# Patient Record
Sex: MALE | Race: WHITE | HISPANIC OR LATINO | Employment: OTHER | URBAN - METROPOLITAN AREA
[De-identification: names, ages, dates, MRNs, and addresses within clinical notes are randomized per-mention and may not be internally consistent; named-entity substitution may affect disease eponyms.]

---

## 2018-11-06 ENCOUNTER — HOSPITAL ENCOUNTER (EMERGENCY)
Facility: HOSPITAL | Age: 63
Discharge: HOME | End: 2018-11-06
Attending: EMERGENCY MEDICINE
Payer: COMMERCIAL

## 2018-11-06 VITALS
DIASTOLIC BLOOD PRESSURE: 70 MMHG | HEART RATE: 51 BPM | HEIGHT: 68 IN | BODY MASS INDEX: 31.83 KG/M2 | RESPIRATION RATE: 18 BRPM | TEMPERATURE: 98 F | SYSTOLIC BLOOD PRESSURE: 146 MMHG | OXYGEN SATURATION: 96 % | WEIGHT: 210 LBS

## 2018-11-06 DIAGNOSIS — M25.572 LEFT ANKLE PAIN: ICD-10-CM

## 2018-11-06 DIAGNOSIS — M25.572 PAIN AND SWELLING OF LEFT ANKLE: Primary | ICD-10-CM

## 2018-11-06 DIAGNOSIS — M25.472 PAIN AND SWELLING OF LEFT ANKLE: Primary | ICD-10-CM

## 2018-11-06 PROCEDURE — 99284 EMERGENCY DEPT VISIT MOD MDM: CPT | Mod: 25

## 2018-11-06 PROCEDURE — 25000003 PHARM REV CODE 250: Performed by: PHYSICIAN ASSISTANT

## 2018-11-06 RX ORDER — ACETAMINOPHEN 325 MG/1
650 TABLET ORAL
Status: COMPLETED | OUTPATIENT
Start: 2018-11-06 | End: 2018-11-06

## 2018-11-06 RX ORDER — HYDROCODONE BITARTRATE AND ACETAMINOPHEN 5; 325 MG/1; MG/1
1 TABLET ORAL EVERY 6 HOURS PRN
Qty: 12 TABLET | Refills: 0 | Status: SHIPPED | OUTPATIENT
Start: 2018-11-06

## 2018-11-06 RX ORDER — NAPROXEN 500 MG/1
500 TABLET ORAL 2 TIMES DAILY WITH MEALS
Qty: 10 TABLET | Refills: 0 | Status: SHIPPED | OUTPATIENT
Start: 2018-11-06

## 2018-11-06 RX ADMIN — ACETAMINOPHEN 650 MG: 325 TABLET, FILM COATED ORAL at 08:11

## 2018-11-07 NOTE — ED TRIAGE NOTES
Patient arrived to ED with c/o left ankle swelling x 3 weeks with difficulty walking.  Was seen in [place] and doesn't know what he was diagnosed with.  Denies injury to area.  Moderate amount of swelling noted to left ankle.

## 2018-11-07 NOTE — DISCHARGE INSTRUCTIONS
Keep the foot elevated. Use compression socks obtained from pharmacy. Low sodium diet. Follow up with orthopedics.

## 2018-11-07 NOTE — ED PROVIDER NOTES
Encounter Date: 11/6/2018    SCRIBE #1 NOTE: I, Kate Valdes, am scribing for, and in the presence of,  Kai Denson PA-C. I have scribed the following portions of the note - Other sections scribed: HPI and ROS.       History     Chief Complaint   Patient presents with    Ankle Pain     left ankle pain x 3 weeks w/ swelling     CC: Ankle Pain    HPI: This 62 y.o female male who has CAD, DM, and HTN presents to the ED for an evaluation of intermittent left ankle pain with associated swelling for the past 3 weeks.  Patient reports pain is worse with ambulating.  Patient is currently residing in [place] and is in town visiting family members.  Patient reports he was evaluated for his ankle pain and swelling by his PCP in [place], but reports being informed of no abnormal findings despite not receiving any imaging.  He reports during that evaluation he received an injection, which he thinks may have been a steroid, to his ankle.  He reports he did not receive any relief after the injection.  He denies any recent falls, trauma, or injuries.  He denies any prior h/o DVT.  He denies fever, chills, extremity numbness, extremity weakness, foot pain, cold sensation to the foot, rash, or any other associated symptoms.  No other prior tx.  No alleviating factors.      The history is provided by the patient. A  was used (patient preference is family member to translate).     Review of patient's allergies indicates:  No Known Allergies  Past Medical History:   Diagnosis Date    Coronary artery disease     Diabetes mellitus     Hypertension      Past Surgical History:   Procedure Laterality Date    EYE SURGERY       No family history on file.  Social History     Tobacco Use    Smoking status: Current Every Day Smoker     Types: Cigarettes   Substance Use Topics    Alcohol use: Yes     Comment: Occasionally     Drug use: No     Review of Systems   Constitutional: Negative for chills and fever.   HENT:  Negative for ear pain and sore throat.    Eyes: Negative for pain.   Respiratory: Negative for cough and shortness of breath.    Cardiovascular: Negative for chest pain.   Gastrointestinal: Negative for abdominal pain, diarrhea, nausea and vomiting.   Genitourinary: Negative for dysuria.   Musculoskeletal: Positive for arthralgias and joint swelling. Negative for back pain.   Skin: Negative for rash.   Neurological: Negative for weakness, numbness and headaches.       Physical Exam     Initial Vitals [11/06/18 1942]   BP Pulse Resp Temp SpO2   (!) 173/97 (!) 56 18 98 °F (36.7 °C) 95 %      MAP       --         Physical Exam    Vitals reviewed.  Constitutional: He appears well-developed and well-nourished. He is not diaphoretic. No distress.   HENT:   Head: Normocephalic and atraumatic.   Right Ear: External ear normal.   Left Ear: External ear normal.   Nose: Nose normal.   Eyes: Conjunctivae are normal. No scleral icterus.   Neck: Normal range of motion. Neck supple.   Cardiovascular: Normal rate, regular rhythm, normal heart sounds and intact distal pulses.   Pulses:       Dorsalis pedis pulses are 2+ on the right side, and 2+ on the left side.        Posterior tibial pulses are 2+ on the right side, and 2+ on the left side.   Pulmonary/Chest: Breath sounds normal. No respiratory distress. He has no wheezes. He has no rhonchi. He has no rales. He exhibits no tenderness.   Musculoskeletal: Normal range of motion. He exhibits edema and tenderness.   Left ankle with generalized nonpitting edema and mild tenderness. Many small varicose veins to bilateral legs and ankles.  Normal range of motion with minimal pain. Pain primarily when walking.  No warmth, erythema, or deformity.  There is no calf tenderness or swelling.  Compartments are soft.   Neurological: He is alert and oriented to person, place, and time. No sensory deficit.   Skin: Skin is warm and dry. No rash and no abscess noted. No erythema. No pallor.          ED Course   Procedures  Labs Reviewed - No data to display       Imaging Results          X-Ray Ankle Complete Left (Final result)  Result time 11/06/18 21:35:16    Final result by Alissa Christensen MD (11/06/18 21:35:16)                 Impression:      No acute osseous abnormality identified.      Electronically signed by: Alissa Christensen MD  Date:    11/06/2018  Time:    21:35             Narrative:    EXAMINATION:  XR ANKLE COMPLETE 3 VIEW LEFT    CLINICAL HISTORY:  Pain in left ankle and joints of left foot    TECHNIQUE:  AP, lateral and oblique views of the left ankle were performed.    COMPARISON:  None    FINDINGS:  No evidence of fracture, dislocation, or osseous destructive process.  Ankle mortise is maintained.  Posterior and plantar calcaneal spurring is seen.  There is soft tissue swelling seen involving the distal aspect of the lower leg and ankle region.  Vascular calcifications are noted.                               US Lower Extremity Veins Left (Final result)  Result time 11/06/18 21:15:35    Final result by Alissa Christensen MD (11/06/18 21:15:35)                 Impression:      No evidence of left lower extremity deep venous thrombosis.      Electronically signed by: Alissa Christensen MD  Date:    11/06/2018  Time:    21:15             Narrative:    EXAMINATION:  US LOWER EXTREMITY VEINS LEFT    CLINICAL HISTORY:  Pain in left ankle and joints of left foot    TECHNIQUE:  Duplex and color flow Doppler evaluation of the left lower extremity veins was performed.    COMPARISON:  None    FINDINGS:  No evidence of clot involving the left common femoral, greater saphenous, femoral, popliteal, peroneal, anterior and posterior tibial veins.  All venous structures demonstrate normal respiratory phasicity and augment adequately.  No evidence of soft tissue mass or Baker's cyst.                              X-Rays:   Independently Interpreted Readings:   Other Readings:  X-ray left ankle with no  obvious fracture or dislocation.  Posterior and plantar calcaneal spurs are present.  Vascular calcifications are noted.  Ultrasound of left lower extremity veins without evidence of DVT.    Medical Decision Making:   ED Management:  62-year-old male with gradually worsening left ankle pain and swelling over 2-3 weeks without known injury or event.  He denies fever or chills or systemic symptoms to suggest sepsis.  He is well-appearing and afebrile.  He has pain when ambulating.  He has good range of motion of the left ankle with nonpitting generalized edema. There is no warmth or erythema.  Low suspicion for septic arthritis.  Recent steroid joint injection provided no relief.  X-ray negative for bony erosion, fracture, dislocation.  Ultrasound negative for DVT.  He has strong and equal distal pulses. Low suspicion for arterial occlusion.  Other considerations include crystal arthropathy, osteoarthritis, venous insufficiency. Will treat with short course of naproxen as well as analgesics, and will have patient follow up with Orthopedics for further evaluation.  Patient and daughter both verbalized understanding and agreed with plan.            Scribe Attestation:   Scribe #1: I performed the above scribed service and the documentation accurately describes the services I performed. I attest to the accuracy of the note.    Attending Attestation:           Physician Attestation for Scribe:  Physician Attestation Statement for Scribe #1: I, Kai Denson PA-C, reviewed documentation, as scribed by Kate Valdes in my presence, and it is both accurate and complete.                    Clinical Impression:   The primary encounter diagnosis was Pain and swelling of left ankle. A diagnosis of Left ankle pain was also pertinent to this visit.                             Kai Denson PA-C  11/07/18 1017